# Patient Record
Sex: MALE | Race: WHITE | NOT HISPANIC OR LATINO | Employment: OTHER | URBAN - METROPOLITAN AREA
[De-identification: names, ages, dates, MRNs, and addresses within clinical notes are randomized per-mention and may not be internally consistent; named-entity substitution may affect disease eponyms.]

---

## 2022-07-01 ENCOUNTER — APPOINTMENT (OUTPATIENT)
Dept: RADIOLOGY | Facility: MEDICAL CENTER | Age: 87
End: 2022-07-01
Attending: EMERGENCY MEDICINE
Payer: MEDICARE

## 2022-07-01 ENCOUNTER — HOSPITAL ENCOUNTER (EMERGENCY)
Facility: MEDICAL CENTER | Age: 87
End: 2022-07-01
Attending: EMERGENCY MEDICINE
Payer: MEDICARE

## 2022-07-01 VITALS
TEMPERATURE: 98 F | DIASTOLIC BLOOD PRESSURE: 67 MMHG | WEIGHT: 183.64 LBS | RESPIRATION RATE: 17 BRPM | BODY MASS INDEX: 27.83 KG/M2 | HEART RATE: 62 BPM | SYSTOLIC BLOOD PRESSURE: 129 MMHG | HEIGHT: 68 IN | OXYGEN SATURATION: 95 %

## 2022-07-01 DIAGNOSIS — R00.2 PALPITATIONS: ICD-10-CM

## 2022-07-01 DIAGNOSIS — I48.92 ATRIAL FLUTTER, UNSPECIFIED TYPE (HCC): ICD-10-CM

## 2022-07-01 LAB
ALBUMIN SERPL BCP-MCNC: 4.8 G/DL (ref 3.2–4.9)
ALBUMIN/GLOB SERPL: 2.2 G/DL
ALP SERPL-CCNC: 99 U/L (ref 30–99)
ALT SERPL-CCNC: 34 U/L (ref 2–50)
ANION GAP SERPL CALC-SCNC: 14 MMOL/L (ref 7–16)
AST SERPL-CCNC: 51 U/L (ref 12–45)
BASOPHILS # BLD AUTO: 0.5 % (ref 0–1.8)
BASOPHILS # BLD: 0.03 K/UL (ref 0–0.12)
BILIRUB SERPL-MCNC: 0.8 MG/DL (ref 0.1–1.5)
BUN SERPL-MCNC: 35 MG/DL (ref 8–22)
CALCIUM SERPL-MCNC: 9.6 MG/DL (ref 8.5–10.5)
CHLORIDE SERPL-SCNC: 102 MMOL/L (ref 96–112)
CO2 SERPL-SCNC: 24 MMOL/L (ref 20–33)
CREAT SERPL-MCNC: 1.09 MG/DL (ref 0.5–1.4)
EKG IMPRESSION: NORMAL
EKG IMPRESSION: NORMAL
EOSINOPHIL # BLD AUTO: 0.12 K/UL (ref 0–0.51)
EOSINOPHIL NFR BLD: 1.8 % (ref 0–6.9)
ERYTHROCYTE [DISTWIDTH] IN BLOOD BY AUTOMATED COUNT: 44.9 FL (ref 35.9–50)
GFR SERPLBLD CREATININE-BSD FMLA CKD-EPI: 66 ML/MIN/1.73 M 2
GLOBULIN SER CALC-MCNC: 2.2 G/DL (ref 1.9–3.5)
GLUCOSE SERPL-MCNC: 90 MG/DL (ref 65–99)
HCT VFR BLD AUTO: 39.5 % (ref 42–52)
HGB BLD-MCNC: 13.5 G/DL (ref 14–18)
IMM GRANULOCYTES # BLD AUTO: 0.03 K/UL (ref 0–0.11)
IMM GRANULOCYTES NFR BLD AUTO: 0.5 % (ref 0–0.9)
LYMPHOCYTES # BLD AUTO: 1.23 K/UL (ref 1–4.8)
LYMPHOCYTES NFR BLD: 18.7 % (ref 22–41)
MCH RBC QN AUTO: 33.1 PG (ref 27–33)
MCHC RBC AUTO-ENTMCNC: 34.2 G/DL (ref 33.7–35.3)
MCV RBC AUTO: 96.8 FL (ref 81.4–97.8)
MONOCYTES # BLD AUTO: 0.71 K/UL (ref 0–0.85)
MONOCYTES NFR BLD AUTO: 10.8 % (ref 0–13.4)
NEUTROPHILS # BLD AUTO: 4.47 K/UL (ref 1.82–7.42)
NEUTROPHILS NFR BLD: 67.7 % (ref 44–72)
NRBC # BLD AUTO: 0 K/UL
NRBC BLD-RTO: 0 /100 WBC
PLATELET # BLD AUTO: 116 K/UL (ref 164–446)
PMV BLD AUTO: 11.2 FL (ref 9–12.9)
POTASSIUM SERPL-SCNC: 4.4 MMOL/L (ref 3.6–5.5)
PROT SERPL-MCNC: 7 G/DL (ref 6–8.2)
RBC # BLD AUTO: 4.08 M/UL (ref 4.7–6.1)
SODIUM SERPL-SCNC: 140 MMOL/L (ref 135–145)
TROPONIN T SERPL-MCNC: 27 NG/L (ref 6–19)
WBC # BLD AUTO: 6.6 K/UL (ref 4.8–10.8)

## 2022-07-01 PROCEDURE — 84484 ASSAY OF TROPONIN QUANT: CPT

## 2022-07-01 PROCEDURE — 99284 EMERGENCY DEPT VISIT MOD MDM: CPT

## 2022-07-01 PROCEDURE — 93005 ELECTROCARDIOGRAM TRACING: CPT

## 2022-07-01 PROCEDURE — 93005 ELECTROCARDIOGRAM TRACING: CPT | Performed by: EMERGENCY MEDICINE

## 2022-07-01 PROCEDURE — 71045 X-RAY EXAM CHEST 1 VIEW: CPT

## 2022-07-01 PROCEDURE — 80053 COMPREHEN METABOLIC PANEL: CPT

## 2022-07-01 PROCEDURE — 85025 COMPLETE CBC W/AUTO DIFF WBC: CPT

## 2022-07-01 RX ORDER — BISOPROLOL FUMARATE 5 MG/1
5 TABLET, FILM COATED ORAL DAILY
COMMUNITY

## 2022-07-01 RX ORDER — FUROSEMIDE 20 MG/1
20 TABLET ORAL DAILY
COMMUNITY

## 2022-07-01 ASSESSMENT — ENCOUNTER SYMPTOMS
SHORTNESS OF BREATH: 0
FOCAL WEAKNESS: 0
SEIZURES: 0
EYE REDNESS: 0
BLURRED VISION: 0
CHILLS: 0
VOMITING: 0
COUGH: 0
ABDOMINAL PAIN: 0
HEADACHES: 0
BACK PAIN: 0
FEVER: 0
SORE THROAT: 0
NECK PAIN: 0
PALPITATIONS: 1

## 2022-07-01 NOTE — ED PROVIDER NOTES
ED Provider Note    CHIEF COMPLAINT  Chief Complaint   Patient presents with   • Rapid Heart Beat     Pt states he had a history of afib, today pt states he [place], pt states he checked his heart rate and was 130 bpm, pt states he took his eliquist on time       HPI  Stanislaw Armendariz is a 87 y.o. male with history of atrial fibrillation and heart failure who presents to the emergency department with concern for elevated heart rate.  Patient is in town visiting for the [place] and states he was busy all day walking a lot and carrying a heavy suitcase.  He had very little to drink over the day.  He was walking back to his room when he noticed his heart rate was elevated in the 130s.  No other significant associated symptoms other than what he describes as a tightness in both of his legs.  No chest pain or shortness of breath or dizziness.  No recent fevers or infectious symptoms.  Patient is on a beta-blocker which he took this morning in addition to Eliquis and furosemide.  He states he does not always take his Eliquis as it makes him bleed and bruise easily but is otherwise compliant with his medications.    REVIEW OF SYSTEMS  See HPI for further details.   Review of Systems   Constitutional: Negative for chills and fever.   HENT: Negative for sore throat.    Eyes: Negative for blurred vision and redness.   Respiratory: Negative for cough and shortness of breath.    Cardiovascular: Positive for palpitations. Negative for chest pain and leg swelling.   Gastrointestinal: Negative for abdominal pain and vomiting.   Genitourinary: Negative for dysuria and urgency.   Musculoskeletal: Negative for back pain and neck pain.   Skin: Negative for rash.   Neurological: Negative for focal weakness, seizures and headaches.   Psychiatric/Behavioral: Negative for suicidal ideas.         PAST MEDICAL HISTORY   has a past medical history of A-fib (HCC), Bladder cancer (HCC), and  "Congestive heart failure (HCC).    SOCIAL HISTORY  Social History     Tobacco Use   • Smoking status: Never Smoker   • Smokeless tobacco: Never Used   Vaping Use   • Vaping Use: Never used   Substance and Sexual Activity   • Alcohol use: Not Currently   • Drug use: Never   • Sexual activity: Not on file       SURGICAL HISTORY  patient denies any surgical history    CURRENT MEDICATIONS  Home Medications     Reviewed by Colleen Pike R.N. (Registered Nurse) on 07/01/22 at 0311  Med List Status: Not Addressed   Medication Last Dose Status   apixaban (ELIQUIS) 5mg Tab  Active   bisoprolol (ZEBETA) 5 MG Tab  Active   furosemide (LASIX) 20 MG Tab  Active                ALLERGIES  Allergies   Allergen Reactions   • Penicillins    • Sulfa Drugs        PHYSICAL EXAM   VITAL SIGNS: /67   Pulse 62   Temp 36.7 °C (98 °F) (Temporal)   Resp 17   Ht 1.727 m (5' 8\")   Wt 83.3 kg (183 lb 10.3 oz)   SpO2 95%   BMI 27.92 kg/m²      Physical Exam  Constitutional:       General: He is not in acute distress.     Comments: Nontoxic-appearing elderly male   HENT:      Head: Normocephalic and atraumatic.   Eyes:      Conjunctiva/sclera: Conjunctivae normal.      Pupils: Pupils are equal, round, and reactive to light.   Cardiovascular:      Rate and Rhythm: Normal rate. Rhythm irregular.      Heart sounds: Normal heart sounds.   Pulmonary:      Effort: Pulmonary effort is normal. No respiratory distress.      Breath sounds: Normal breath sounds.   Abdominal:      General: There is no distension.      Palpations: Abdomen is soft.      Tenderness: There is no abdominal tenderness.   Musculoskeletal:         General: No tenderness. Normal range of motion.      Cervical back: Normal range of motion and neck supple.      Right lower leg: Edema present.      Left lower leg: Edema present.      Comments: 1-2+ pitting edema bilaterally   Skin:     General: Skin is warm and dry.   Neurological:      Mental Status: He is alert and " oriented to person, place, and time.      Comments: Moving all extremities spontaneously   Psychiatric:         Mood and Affect: Affect normal.           DIAGNOSTIC STUDIES    EKG  Results for orders placed or performed during the hospital encounter of 22   EKG   Result Value Ref Range    Report       [facility]    Test Date:  2022  [facility]              Department: ER  MRN:        [id number]                      Room:  Gender:     Male                         Technician: [id number]  :        1935                   Requested By:ER TRIAGE PROTOCOL  Order #:    [id number]                    Reading MD: Alley Dodson MD    Measurements  Intervals                                Axis  Rate:       126                          P:          0  IN:         63                           QRS:        42  QRSD:       80                           T:          38  QT:         316  QTc:        458    Interpretive Statements  SINUS TACHYCARDIA  Normal intervals, no ectopy  No ST or T wave change  No previous ECG available for comparison  Electronically Signed On 2022 2:58:08 PDT by Alley Dodson MD     EKG   Result Value Ref Range    Report       [facility] Emergency Dept.    Test Date:  2022  Pt Name:    KAMI [facility]              Department: ER  MRN:        [id number]                      Room:  Gender:     Male                         Technician: [id number]  :        1935                   Requested By:ER TRIAGE PROTOCOL  Order #:    [id number]                    Reading MD: Alley Dodson MD    Measurements  Intervals                                Axis  Rate:       63                           P:  IN:                                      QRS:        -50  QRSD:       94                           T:          17  QT:         452  QTc:        463    Interpretive Statements  A-FLUTTER W/ PREDOM 4:1 AV BLOCK  LAD, CONSIDER LEFT ANTERIOR  FASCICULAR BLOCK  BORDERLINE LOW VOLTAGE IN FRONTAL LEADS  No ST or T wave change  Compared to ECG 07/01/2022 01:47:08  Atrial flutter now present  Electronically Signed On 7-1-2022 6:17:56 PDT by Alley Dodson MD             LABS  Personally reviewed by me  Labs Reviewed   CBC WITH DIFFERENTIAL - Abnormal; Notable for the following components:       Result Value    RBC 4.08 (*)     Hemoglobin 13.5 (*)     Hematocrit 39.5 (*)     MCH 33.1 (*)     Platelet Count 116 (*)     Lymphocytes 18.70 (*)     All other components within normal limits   COMP METABOLIC PANEL - Abnormal; Notable for the following components:    Bun 35 (*)     AST(SGOT) 51 (*)     All other components within normal limits   TROPONIN - Abnormal; Notable for the following components:    Troponin T 27 (*)     All other components within normal limits   ESTIMATED GFR           RADIOLOGY  Personally reviewed by me  DX-CHEST-PORTABLE (1 VIEW)   Final Result         1.  No acute cardiopulmonary disease.   2.  Cardiomegaly              ED COURSE  Vitals:    07/01/22 0251 07/01/22 0255 07/01/22 0330 07/01/22 0600   BP:  137/74 133/77 129/67   Pulse: 87 63 72 62   Resp:  18 (!) 23 17   Temp:    36.7 °C (98 °F)   TempSrc:    Temporal   SpO2: 98% 99% 99% 95%   Weight:       Height:             Medications administered:  Medications - No data to display      Old records personally reviewed:  None available        MEDICAL DECISION MAKING  Patient with history of atrial fibrillation on beta-blocker and anticoagulation who presents with concern for rapid heart rate.  The patient is essentially asymptomatic.  He was tachycardic on arrival.  Initial EKG shows sinus tachycardia with a rate of 130.  Vital signs otherwise normal.  At the time of my evaluation, the patient's heart rate has improved into the 60s and 70s.  Repeat EKG was performed now showing atrial flutter with controlled rates.  Labs do not show evidence of anemia, electrolyte abnormality, acidosis.   He may be mildly dehydrated with an elevated BUN.  Troponin was minimally elevated however the patient is not having any symptoms concerning for ACS.  We will plan to discharge home with continuation of home medication and follow-up with his doctors once he returns home.    Upon reassessment, patient is resting comfortably with normal vital signs.  No new complaints at this time.  Discussed results with patient and/or family as well as importance of primary care follow up.  Patient understands plan of care and strict return precautions for new or changing symptoms.         IMPRESSION  (R00.2) Palpitations  (I48.92) Atrial flutter, unspecified type (HCC)    Disposition: Discharge home, stable condition  Results, diagnoses, and treatment options were discussed with the patient and/or family. Patient verbalized understanding of plan of care.    Patient referred to primary care provider for monitoring and treatment of blood pressure.      Discharge Medication List as of 7/1/2022  6:24 AM              Electronically signed by: Alley Dodson M.D., 7/1/2022 3:27 AM

## 2022-07-01 NOTE — ED TRIAGE NOTES
Chief Complaint   Patient presents with   • Rapid Heart Beat     Pt states he had a history of afib, today pt states he felt [place], pt states he checked his heart rate and was 130 bpm, pt states he took his eliquist on time     Pt ambulatory to triage for above complaint.      Pt is alert/oriented and follows commands. Pt speaking in full sentences and responds appropriately to questions. No acute distress noted in triage and respirations are even and unlabored.     Pt placed in lobby and educated on triage process. Pt encouraged to alert staff for any changes in condition.

## 2022-07-01 NOTE — DISCHARGE INSTRUCTIONS
You were seen in the Emergency Department for palpitations.    Labs were completed without significant acute abnormalities.    Please continue home medications as directed and ensure you are drinking plenty of fluids.    Please follow up with your primary care physician.    Return to the Emergency Department with chest pain, trouble breathing, lightheadedness or fainting, or other concerns.